# Patient Record
Sex: MALE | Race: WHITE
[De-identification: names, ages, dates, MRNs, and addresses within clinical notes are randomized per-mention and may not be internally consistent; named-entity substitution may affect disease eponyms.]

---

## 2018-01-07 ENCOUNTER — HOSPITAL ENCOUNTER (EMERGENCY)
Dept: HOSPITAL 25 - UCEAST | Age: 38
Discharge: HOME | End: 2018-01-07
Payer: COMMERCIAL

## 2018-01-07 VITALS — SYSTOLIC BLOOD PRESSURE: 176 MMHG | DIASTOLIC BLOOD PRESSURE: 96 MMHG

## 2018-01-07 DIAGNOSIS — J11.1: Primary | ICD-10-CM

## 2018-01-07 DIAGNOSIS — I10: ICD-10-CM

## 2018-01-07 DIAGNOSIS — E66.9: ICD-10-CM

## 2018-01-07 DIAGNOSIS — Z87.891: ICD-10-CM

## 2018-01-07 PROCEDURE — 99212 OFFICE O/P EST SF 10 MIN: CPT

## 2018-01-07 PROCEDURE — G0463 HOSPITAL OUTPT CLINIC VISIT: HCPCS

## 2018-01-07 PROCEDURE — 87502 INFLUENZA DNA AMP PROBE: CPT

## 2018-01-07 PROCEDURE — 87651 STREP A DNA AMP PROBE: CPT

## 2018-01-07 NOTE — UC
Throat Pain/Nasal Bright HPI





- HPI Summary


HPI Summary: 


Sore throat for 3 days








- History of Current Complaint


Chief Complaint: UCRespiratory


Stated Complaint: SORE THROAT RESP ISSUE


Time Seen by Provider: 01/07/18 10:11


Hx Obtained From: Patient


Onset/Duration: Gradual Onset, Lasting Days - 2, Still Present


Severity: Moderate


Pain Scale Used: 0-10 Numeric


Cough: Nonproductive


Associated Signs & Symptoms: Positive: Negative





- Allergies/Home Medications


Allergies/Adverse Reactions: 


 Allergies











Allergy/AdvReac Type Severity Reaction Status Date / Time


 


No Known Allergies Allergy   Verified 01/07/18 09:57














PMH/Surg Hx/FS Hx/Imm Hx


Previously Healthy: Yes





- Surgical History


Surgical History: Yes


Surgery Procedure, Year, and Place: Tonsillectomy at age 21





- Family History


Known Family History: Positive: None





- Social History


Occupation: Employed Full-time


Lives: With Family


Alcohol Use: Occasionally


Substance Use Type: None


Smoking Status (MU): Former Smoker





- Immunization History


Most Recent Tetanus Shot: UTD





Review of Systems


Constitutional: Fever, Chills, Fatigue


Skin: Negative


Eyes: Negative


ENT: Negative


Respiratory: Negative


Cardiovascular: Negative


Gastrointestinal: Negative


Genitourinary: Negative


Motor: Negative


Neurovascular: Negative


Musculoskeletal: Arthralgia, Myalgia


Neurological: Negative


Psychological: Negative


Is Patient Immunocompromised?: No


All Other Systems Reviewed And Are Negative: Yes





Physical Exam


Triage Information Reviewed: Yes


Appearance: No Pain Distress, Ill-Appearing - mild, Obese


Vital Signs: 


 Initial Vital Signs











Temp  98 F   01/07/18 09:58


 


Pulse  86   01/07/18 09:58


 


Resp  16   01/07/18 09:58


 


BP  176/96   01/07/18 09:58


 


Pulse Ox  100   01/07/18 09:58











Vital Signs Reviewed: Yes


Eye Exam: Normal


Eyes: Positive: Conjunctiva Clear


ENT Exam: Normal


ENT: Positive: Normal ENT inspection, Hearing grossly normal, Pharynx normal, 

Nasal congestion, Uvula midline.  Negative: Tonsillar swelling, Trismus, Hoarse 

voice, Sinus tenderness


Dental Exam: Normal


Neck exam: Normal


Neck: Positive: Supple, Nontender, No Lymphadenopathy


Respiratory Exam: Normal


Respiratory: Positive: Chest non-tender, Lungs clear, Normal breath sounds, No 

respiratory distress, No accessory muscle use


Cardiovascular Exam: Normal


Cardiovascular: Positive: RRR, No Murmur, Pulses Normal, Brisk Capillary Refill


Musculoskeletal Exam: Normal


Musculoskeletal: Positive: Strength Intact, ROM Intact, No Edema


Neurological Exam: Normal


Neurological: Positive: Alert, Muscle Tone Normal


Psychological Exam: Normal


Skin Exam: Normal





Diagnostics





- Laboratory


Diagnostic Studies Completed/Ordered: Influenza A (+)





Throat Pain/Nasal Course/Dx





- Course


Assessment/Plan: Tamiflu, rest tylenol, ibuprofen increase fluids follow BP 

with PCP





- Differential Dx/Diagnosis


Provider Diagnoses: Influenza A, Elevated Blood Pressure with Dx of Hypertension





Discharge





- Discharge Plan


Condition: Stable


Disposition: HOME


Prescriptions: 


Oseltamivir CAP* [Tamiflu CAP*] 75 mg PO BID #10 cap


Patient Education Materials:  Influenza (ED), DASH Eating Plan (ED), 

Hypertension (ED)


Referrals: 


[facility] [Outside] - 2 Weeks


No Primary Care Phys,NOPCP [Primary Care Provider] -